# Patient Record
Sex: MALE | Race: WHITE | NOT HISPANIC OR LATINO | Employment: OTHER | URBAN - METROPOLITAN AREA
[De-identification: names, ages, dates, MRNs, and addresses within clinical notes are randomized per-mention and may not be internally consistent; named-entity substitution may affect disease eponyms.]

---

## 2022-10-12 ENCOUNTER — HOSPITAL ENCOUNTER (OUTPATIENT)
Dept: RADIOLOGY | Facility: CLINIC | Age: 67
Discharge: HOME | End: 2022-10-12
Attending: SPECIALIST
Payer: COMMERCIAL

## 2022-10-12 ENCOUNTER — OFFICE VISIT (OUTPATIENT)
Dept: ORTHOPEDICS | Facility: CLINIC | Age: 67
End: 2022-10-12
Payer: COMMERCIAL

## 2022-10-12 VITALS
SYSTOLIC BLOOD PRESSURE: 133 MMHG | WEIGHT: 196.19 LBS | HEART RATE: 56 BPM | DIASTOLIC BLOOD PRESSURE: 90 MMHG | BODY MASS INDEX: 29.06 KG/M2 | HEIGHT: 69 IN

## 2022-10-12 DIAGNOSIS — M51.36 DDD (DEGENERATIVE DISC DISEASE), LUMBAR: ICD-10-CM

## 2022-10-12 DIAGNOSIS — M54.32 SCIATICA OF LEFT SIDE: ICD-10-CM

## 2022-10-12 DIAGNOSIS — M54.16 LUMBAR RADICULOPATHY: ICD-10-CM

## 2022-10-12 DIAGNOSIS — M25.562 LEFT KNEE PAIN, UNSPECIFIED CHRONICITY: ICD-10-CM

## 2022-10-12 DIAGNOSIS — M25.552 LEFT HIP PAIN: Primary | ICD-10-CM

## 2022-10-12 DIAGNOSIS — M25.552 LEFT HIP PAIN: ICD-10-CM

## 2022-10-12 PROCEDURE — 72100 X-RAY EXAM L-S SPINE 2/3 VWS: CPT | Mod: ,,, | Performed by: SPECIALIST

## 2022-10-12 PROCEDURE — 73564 X-RAY EXAM KNEE 4 OR MORE: CPT | Mod: LT,,, | Performed by: SPECIALIST

## 2022-10-12 PROCEDURE — 99203 PR OFFICE/OUTPT VISIT, NEW, LEVL III, 30-44 MIN: ICD-10-PCS | Mod: ,,, | Performed by: PHYSICIAN ASSISTANT

## 2022-10-12 PROCEDURE — 72100 XR LUMBAR SPINE AP AND LATERAL: ICD-10-PCS | Mod: ,,, | Performed by: SPECIALIST

## 2022-10-12 PROCEDURE — 73564 XR KNEE COMP 4 OR MORE VIEWS LEFT: ICD-10-PCS | Mod: LT,,, | Performed by: SPECIALIST

## 2022-10-12 PROCEDURE — 99203 OFFICE O/P NEW LOW 30 MIN: CPT | Mod: ,,, | Performed by: PHYSICIAN ASSISTANT

## 2022-10-12 RX ORDER — ASPIRIN 81 MG/1
81 TABLET ORAL DAILY
COMMUNITY

## 2022-10-12 RX ORDER — FENOFIBRATE 160 MG/1
160 TABLET ORAL DAILY
COMMUNITY
Start: 2022-10-10

## 2022-10-12 RX ORDER — PANTOPRAZOLE SODIUM 40 MG/1
40 TABLET, DELAYED RELEASE ORAL DAILY
COMMUNITY
Start: 2022-10-10

## 2022-10-12 RX ORDER — HYDROCODONE BITARTRATE AND ACETAMINOPHEN 10; 325 MG/1; MG/1
1 TABLET ORAL
COMMUNITY

## 2022-10-12 RX ORDER — TADALAFIL 5 MG/1
5 TABLET ORAL DAILY PRN
COMMUNITY

## 2022-10-12 RX ORDER — LISINOPRIL 40 MG/1
40 TABLET ORAL DAILY
COMMUNITY
Start: 2022-10-10

## 2022-10-12 RX ORDER — ERGOCALCIFEROL 1.25 MG/1
50000 CAPSULE ORAL
COMMUNITY
Start: 2022-10-10

## 2022-10-12 RX ORDER — CLOMIPHENE CITRATE 50 MG/1
50 TABLET ORAL DAILY
COMMUNITY

## 2022-10-12 RX ORDER — ROSUVASTATIN CALCIUM 10 MG/1
10 TABLET, COATED ORAL NIGHTLY
COMMUNITY
Start: 2022-10-10

## 2022-10-12 RX ORDER — LEVOTHYROXINE SODIUM 50 UG/1
50 TABLET ORAL DAILY
COMMUNITY
Start: 2022-10-10

## 2022-10-12 NOTE — PROGRESS NOTES
"Chief Complaint:   Chief Complaint   Patient presents with    Left Knee - Pain    Spine - Pain    Pain     left knee pain/side of the hip towards the back states it has been ongoing for couple months, left knee sx was in 2006 w/ JPS denies weakness.        History of present illness:    This is a 67 y.o. year old male who complains of left lateral hip and buttock pain  States it radiates to the knee  No groin pain    Review of Systems:    Constitution:   Denies chills, fever, and sweats.  HENT:   Denies headaches or blurry vision.  Cardiovascular:  Denies chest pain or irregular heart beat.  Respiratory:   Denies cough or shortness of breath.  Gastrointestinal:  Denies abdominal pain, nausea, or vomiting.  Musculoskeletal:   Denies muscle cramps.  Neurological:   Denies dizziness or focal weakness.  Psychiatric/Behavior: Normal mental status.  Hematology/Lymph:  Denies bleeding problem or easy bruising/bleeding.  Skin:    Denies rash or suspicious lesions.    Examination:    Vital Signs:    Vitals:    10/12/22 0900   BP: (!) 133/90   Pulse: (!) 56   Weight: 89 kg (196 lb 3.2 oz)   Height: 5' 9" (1.753 m)       Body mass index is 28.97 kg/m².    Constitution:   Well-developed, well nourished patient in no acute distress.  Neurological:   Alert and oriented x 3 and cooperative to examination.     Psychiatric/Behavior: Normal mental status.  Respiratory:   No shortness of breath.  Eyes:    Extraoccular muscles intact  Skin:    No scars, rash or suspicious lesions.    Physical Exam:   Lumbar Exam     No swelling, erythema or increased heat   positive tenderness over spinous process and paravertebral musculature   positive Discomfort with lumbar flexion, extension, lateral rotation and bending   Manual motor testing of the lower extremities is 5 out of 5 bilaterally   DTRs are intact and symmetrical  negative Straight leg raise   No sensory deficits to light touch pedal pulses 2+   Full pain-free range of motion through " the right and left hip joint     lumbar radiographs taken office today show   Maintained disc spaces  Evidence of DDD with facet hypertrophy      Left knee exam  Full ROM  Healed surgical scar  2+ DP pulse  Intact strength LLE      Imaging: X-rays ordered and images interpreted today personally by me of brandon fitzgerald  Intact prosthesis in good position        Assessment: Left hip pain  -     X-Ray Lumbar Spine AP And Lateral; Future; Expected date: 10/12/2022    Left knee pain, unspecified chronicity  -     X-Ray Knee Complete 4 or More Views Left; Future; Expected date: 10/12/2022  -     X-Ray Lumbar Spine AP And Lateral; Future; Expected date: 10/12/2022         Plan:  will get a MRI of his lumbar spine  Will call with results and if needed will refer for LESI  Can not use NSAIDS due to history of gastric ulcers with a bleed    Patient seen and examined by Dr Saenz and POC determined by him          DISCLAIMER: This note may have been dictated using voice recognition software and may contain grammatical errors.     NOTE: Consult report sent to referring provider via [employer] EMR.

## 2022-11-04 ENCOUNTER — OFFICE VISIT (OUTPATIENT)
Dept: ORTHOPEDICS | Facility: CLINIC | Age: 67
End: 2022-11-04
Payer: COMMERCIAL

## 2022-11-04 VITALS
DIASTOLIC BLOOD PRESSURE: 95 MMHG | BODY MASS INDEX: 28.94 KG/M2 | SYSTOLIC BLOOD PRESSURE: 151 MMHG | HEART RATE: 57 BPM | WEIGHT: 196 LBS

## 2022-11-04 DIAGNOSIS — M54.16 LUMBAR RADICULITIS: ICD-10-CM

## 2022-11-04 DIAGNOSIS — M51.36 LUMBAR DEGENERATIVE DISC DISEASE: ICD-10-CM

## 2022-11-04 DIAGNOSIS — M51.26 DISC DISPLACEMENT, LUMBAR: ICD-10-CM

## 2022-11-04 DIAGNOSIS — M54.9 CHRONIC BACK PAIN GREATER THAN 3 MONTHS DURATION: Primary | ICD-10-CM

## 2022-11-04 DIAGNOSIS — G89.29 CHRONIC BACK PAIN GREATER THAN 3 MONTHS DURATION: Primary | ICD-10-CM

## 2022-11-04 PROCEDURE — 99203 OFFICE O/P NEW LOW 30 MIN: CPT | Mod: ,,, | Performed by: ANESTHESIOLOGY

## 2022-11-04 PROCEDURE — 99203 PR OFFICE/OUTPT VISIT, NEW, LEVL III, 30-44 MIN: ICD-10-PCS | Mod: ,,, | Performed by: ANESTHESIOLOGY

## 2022-11-04 NOTE — PROGRESS NOTES
Sanaz Gonzalez MD        PATIENT NAME: Ricardo Tuttle  : 1955  DATE: 22  MRN: [id number]      Billing Provider: Sanaz Gonzalez MD  Level of Service:   Patient PCP Information       Provider PCP Type    Primary Doctor No General            Reason for Visit / Chief Complaint: Back Pain (Referral per DR. Saenz for back pain. MRI done 2 weeks ago [facility] in [place].pt taking Lortab for pain hurts to walk sometimes.)       Update PCP  Update Chief Complaint         History of Present Illness / Problem Focused Workflow     Ricardo Tuttle presents to the clinic with Back Pain (Referral per DR. Saenz for back pain. MRI done 2 weeks ago [facility] in [place].pt taking Lortab for pain hurts to walk sometimes.)     This is a 67-year-old male who presents to clinic today for his initial consultation as a referral from his orthopedist.  He complains of back pain that radiates down the left lower extremity to the knee.  He has had low back pain off and on for many years, but has been getting progressively worse over the past few months.  He does recall 1 episode of pain that radiated down the right lower extremity to his foot.  He denies any numbness or tingling in the lower extremities.  He can not lie on his left side to sleep at night.  He currently rates his pain as 6/10 on the NRS.  He has been taking Norco for many years prescribed by his primary care physician.    Back Pain  Associated symptoms include leg pain.   Review of Systems     Review of Systems   Musculoskeletal:  Positive for back pain, gait problem and leg pain.   All other systems reviewed and are negative.     Medical / Social / Family History     Past Medical History:   Diagnosis Date    High cholesterol     Thyroid disease        Past Surgical History:   Procedure Laterality Date    COLON SURGERY      KNEE SURGERY Left        Social History  Mr. Tuttle  reports that he has never smoked. He has never used smokeless tobacco. He reports that he does  not currently use alcohol.    Family History  's Marry family history is not on file.    Medications and Allergies     Medications  Outpatient Medications Marked as Taking for the 11/4/22 encounter (Office Visit) with Sanaz Gonzalez MD   Medication Sig Dispense Refill    aspirin (ECOTRIN) 81 MG EC tablet Take 81 mg by mouth once daily.      clomiPHENE (CLOMID) 50 mg tablet Take 50 mg by mouth once daily.      ergocalciferol (ERGOCALCIFEROL) 50,000 unit Cap Take 50,000 Units by mouth every 7 days.      fenofibrate 160 MG Tab Take 160 mg by mouth once daily.      HYDROcodone-acetaminophen (NORCO)  mg per tablet Take 1 tablet by mouth.      lisinopriL (PRINIVIL,ZESTRIL) 40 MG tablet Take 40 mg by mouth once daily.      pantoprazole (PROTONIX) 40 MG tablet Take 40 mg by mouth once daily.      rosuvastatin (CRESTOR) 10 MG tablet Take 10 mg by mouth every evening.      SYNTHROID 50 mcg tablet Take 50 mcg by mouth once daily.      tadalafiL (CIALIS) 5 MG tablet Take 5 mg by mouth daily as needed for Erectile Dysfunction.         Allergies  Review of patient's allergies indicates:  No Known Allergies    Physical Examination     Vitals:    11/04/22 0855   BP: (!) 151/95   Pulse: (!) 57     Spine Musculoskeletal Exam    Gait    Gait is normal.    Inspection    Thoracolumbar    Thoracolumbar inspection is normal.    Palpation    Thoracolumbar    Thoracolumbar palpation is normal.    Tenderness: present      Paraspinous: right and left    Range of Motion    Thoracolumbar        Thoracolumbar range of motion additional comments: Restricted range of motion in the lumbar spine secondary to pain    Strength    Thoracolumbar    Thoracolumbar motor exam is normal.       Sensory    Thoracolumbar    Thoracolumbar sensation is normal.    Special Tests    Thoracolumbar      Right      SLR: no back or leg pain      Left      SLR: pain radiates to left leg    General      Constitutional: appears stated age, well-developed and  well-nourished    Scleral icterus: no    Labored breathing: no    Psychiatric: normal mood and affect and no acute distress    Neurological: alert and oriented x3    Skin: intact    Lymphadenopathy: none     Assessment and Plan (including Health Maintenance)      Problem List  Smart Sets  Document Outside HM   :    Plan:   Chronic back pain greater than 3 months duration    Disc displacement, lumbar    Lumbar radiculitis    Lumbar degenerative disc disease     Them scheduling the patient for bilateral L4 transforaminal epidural steroid injections to help with his chronic back pain and lumbar radiculitis due to disc displacement and spinal stenosis seen on his MRI at L4-5.  The plan was discussed with the patient and he wishes to proceed.    Problem List Items Addressed This Visit          Orthopedic Problems    Disc displacement, lumbar    Lumbar degenerative disc disease       Other    Chronic back pain greater than 3 months duration - Primary    Lumbar radiculitis         No future appointments.     There are no Patient Instructions on file for this visit.  No follow-ups on file.     Signature:  Sanaz Gonzalez MD      Date of encounter: 11/4/22